# Patient Record
Sex: FEMALE | Race: WHITE | NOT HISPANIC OR LATINO | Employment: OTHER | URBAN - METROPOLITAN AREA
[De-identification: names, ages, dates, MRNs, and addresses within clinical notes are randomized per-mention and may not be internally consistent; named-entity substitution may affect disease eponyms.]

---

## 2022-03-07 ENCOUNTER — NEW PATIENT (OUTPATIENT)
Dept: URBAN - METROPOLITAN AREA CLINIC 45 | Facility: CLINIC | Age: 62
End: 2022-03-07

## 2022-03-07 DIAGNOSIS — H52.203: ICD-10-CM

## 2022-03-07 DIAGNOSIS — H52.03: ICD-10-CM

## 2022-03-07 PROCEDURE — 92015 DETERMINE REFRACTIVE STATE: CPT

## 2022-03-07 PROCEDURE — 92004 COMPRE OPH EXAM NEW PT 1/>: CPT

## 2022-03-07 ASSESSMENT — VISUAL ACUITY
OD_SC: 20/30+2
OD_SC: J3
OU_SC: 20/20
OS_SC: J3
OS_SC: 20/25+2
OU_SC: J3

## 2022-03-07 ASSESSMENT — TONOMETRY
OS_IOP_MMHG: 16
OD_IOP_MMHG: 16

## 2022-03-07 NOTE — PATIENT DISCUSSION
Asymmetric CDR OD>OS, IOP normotensive today. No FHx of POAG. pt reported never previously been informed of nerve asymmetry. RTC 4 month for baseline OCT and pachy/gonio.